# Patient Record
Sex: FEMALE | Race: WHITE
[De-identification: names, ages, dates, MRNs, and addresses within clinical notes are randomized per-mention and may not be internally consistent; named-entity substitution may affect disease eponyms.]

---

## 2020-09-05 ENCOUNTER — HOSPITAL ENCOUNTER (OUTPATIENT)
Dept: HOSPITAL 19 - ZCOL.LAB | Age: 60
End: 2020-09-05
Payer: SELF-PAY

## 2020-09-05 DIAGNOSIS — Z20.828: Primary | ICD-10-CM

## 2021-01-08 ENCOUNTER — HOSPITAL ENCOUNTER (OUTPATIENT)
Dept: HOSPITAL 19 - COL.ER | Age: 61
End: 2021-01-08

## 2021-01-08 DIAGNOSIS — Z20.822: Primary | ICD-10-CM

## 2021-04-04 ENCOUNTER — HOSPITAL ENCOUNTER (EMERGENCY)
Dept: HOSPITAL 19 - COL.ER | Age: 61
Discharge: HOME | End: 2021-04-04
Attending: EMERGENCY MEDICINE
Payer: COMMERCIAL

## 2021-04-04 VITALS — DIASTOLIC BLOOD PRESSURE: 80 MMHG | SYSTOLIC BLOOD PRESSURE: 130 MMHG | TEMPERATURE: 97.8 F

## 2021-04-04 VITALS — HEIGHT: 64.02 IN | BODY MASS INDEX: 25.33 KG/M2 | WEIGHT: 148.37 LBS

## 2021-04-04 VITALS — HEART RATE: 84 BPM
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
## 2021-04-04 DIAGNOSIS — S80.01XA: Primary | ICD-10-CM

## 2021-04-04 DIAGNOSIS — Z87.891: ICD-10-CM
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
## 2021-04-04 DIAGNOSIS — Z88.0: ICD-10-CM

## 2021-04-16 ENCOUNTER — HOSPITAL ENCOUNTER (OUTPATIENT)
Dept: HOSPITAL 19 - WSOH | Age: 61
LOS: 79 days | Discharge: HOME | End: 2021-07-04
Attending: PHYSICIAN ASSISTANT
Payer: COMMERCIAL
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
## 2021-04-16 DIAGNOSIS — Z87.891: ICD-10-CM

## 2021-04-16 DIAGNOSIS — Z90.89: ICD-10-CM

## 2021-04-16 DIAGNOSIS — Z98.1: ICD-10-CM

## 2021-04-16 DIAGNOSIS — M17.11: ICD-10-CM

## 2021-04-16 DIAGNOSIS — K21.9: ICD-10-CM

## 2021-04-16 DIAGNOSIS — S80.01XA: Primary | ICD-10-CM

## 2021-04-16 DIAGNOSIS — M85.80: ICD-10-CM

## 2021-06-23 ENCOUNTER — HOSPITAL ENCOUNTER (OUTPATIENT)
Dept: HOSPITAL 19 - COL.LAB | Age: 61
End: 2021-06-23
Attending: INTERNAL MEDICINE

## 2021-06-23 DIAGNOSIS — Z20.822: Primary | ICD-10-CM

## 2021-10-05 ENCOUNTER — HOSPITAL ENCOUNTER (OUTPATIENT)
Dept: HOSPITAL 19 - EUO | Age: 61
Discharge: HOME | End: 2021-10-05
Attending: FAMILY MEDICINE
Payer: COMMERCIAL

## 2021-10-05 VITALS — SYSTOLIC BLOOD PRESSURE: 142 MMHG | HEART RATE: 102 BPM | DIASTOLIC BLOOD PRESSURE: 89 MMHG

## 2021-10-05 VITALS — HEART RATE: 97 BPM | SYSTOLIC BLOOD PRESSURE: 136 MMHG | DIASTOLIC BLOOD PRESSURE: 90 MMHG

## 2021-10-05 VITALS — DIASTOLIC BLOOD PRESSURE: 104 MMHG | HEART RATE: 101 BPM | SYSTOLIC BLOOD PRESSURE: 145 MMHG

## 2021-10-05 VITALS — HEART RATE: 96 BPM | SYSTOLIC BLOOD PRESSURE: 143 MMHG | DIASTOLIC BLOOD PRESSURE: 91 MMHG

## 2021-10-05 VITALS — WEIGHT: 149.91 LBS | HEIGHT: 64.02 IN | BODY MASS INDEX: 25.59 KG/M2

## 2021-10-05 VITALS — DIASTOLIC BLOOD PRESSURE: 82 MMHG | SYSTOLIC BLOOD PRESSURE: 140 MMHG | HEART RATE: 90 BPM

## 2021-10-05 VITALS — SYSTOLIC BLOOD PRESSURE: 137 MMHG | HEART RATE: 95 BPM | DIASTOLIC BLOOD PRESSURE: 73 MMHG | TEMPERATURE: 100.7 F

## 2021-10-05 VITALS — HEART RATE: 96 BPM | TEMPERATURE: 98.9 F | SYSTOLIC BLOOD PRESSURE: 149 MMHG | DIASTOLIC BLOOD PRESSURE: 85 MMHG

## 2021-10-05 DIAGNOSIS — U07.1: Primary | ICD-10-CM

## 2021-10-05 DIAGNOSIS — J98.4: ICD-10-CM

## 2021-10-05 NOTE — NUR
Pt tolerated infusion without issue. She is escorted out to ED entrance
following 1 hr observation time. INT was DC'd with catheter intact.

## 2022-01-09 ENCOUNTER — HOSPITAL ENCOUNTER (EMERGENCY)
Dept: HOSPITAL 19 - COL.ER | Age: 62
Discharge: HOME | End: 2022-01-09
Attending: EMERGENCY MEDICINE
Payer: COMMERCIAL

## 2022-01-09 VITALS — TEMPERATURE: 98 F

## 2022-01-09 VITALS — WEIGHT: 155.43 LBS | HEIGHT: 67.01 IN | BODY MASS INDEX: 24.39 KG/M2

## 2022-01-09 VITALS — DIASTOLIC BLOOD PRESSURE: 78 MMHG | HEART RATE: 73 BPM | SYSTOLIC BLOOD PRESSURE: 132 MMHG

## 2022-01-09 DIAGNOSIS — K57.92: Primary | ICD-10-CM

## 2022-01-09 DIAGNOSIS — Z88.0: ICD-10-CM

## 2022-01-09 DIAGNOSIS — K21.9: ICD-10-CM

## 2022-01-09 DIAGNOSIS — Z79.899: ICD-10-CM

## 2022-01-09 LAB
ALBUMIN SERPL-MCNC: 4.1 GM/DL (ref 3.4–4.8)
ALP SERPL-CCNC: 78 U/L (ref 40–150)
ALT SERPL-CCNC: 23 U/L (ref 0–55)
ANION GAP SERPL CALC-SCNC: 13 MMOL/L (ref 7–16)
AST SERPL-CCNC: 26 U/L (ref 5–34)
BASOPHILS # BLD: 0 K/MM3 (ref 0–0.2)
BASOPHILS NFR BLD AUTO: 0.6 % (ref 0–2)
BILIRUB SERPL-MCNC: 0.8 MG/DL (ref 0.2–1.2)
BUN SERPL-MCNC: 11 MG/DL (ref 10–20)
CALCIUM SERPL-MCNC: 9.7 MG/DL (ref 8.4–10.2)
CHLORIDE SERPL-SCNC: 104 MMOL/L (ref 98–107)
CO2 SERPL-SCNC: 24 MMOL/L (ref 23–31)
CREAT SERPL-SCNC: 0.71 MG/DL (ref 0.57–1.11)
EOSINOPHIL # BLD: 0.1 K/MM3 (ref 0–0.7)
EOSINOPHIL NFR BLD: 0.8 % (ref 0–4)
ERYTHROCYTE [DISTWIDTH] IN BLOOD BY AUTOMATED COUNT: 13 % (ref 11.5–14.5)
GLUCOSE SERPL-MCNC: 92 MG/DL (ref 70–99)
GRANULOCYTES # BLD AUTO: 51.7 % (ref 42.2–75.2)
HCT VFR BLD AUTO: 41.1 % (ref 37–47)
HGB BLD-MCNC: 13.8 G/DL (ref 12.5–16)
KETONES UR STRIP.AUTO-MCNC: (no result) MG/DL
LIPASE SERPL-CCNC: 23 U/L (ref 8–78)
LYMPHOCYTES # BLD: 2.8 K/MM3 (ref 1.2–3.4)
LYMPHOCYTES NFR BLD: 38.4 % (ref 20–51)
MCH RBC QN AUTO: 33 PG (ref 27–31)
MCHC RBC AUTO-ENTMCNC: 34 G/DL (ref 33–37)
MCV RBC AUTO: 97 FL (ref 80–100)
MONOCYTES # BLD: 0.6 K/MM3 (ref 0.1–0.6)
MONOCYTES NFR BLD AUTO: 8.4 % (ref 1.7–9.3)
NEUTROPHILS # BLD: 3.7 K/MM3 (ref 1.4–6.5)
PH UR STRIP.AUTO: 6 [PH] (ref 5–8)
PLATELET # BLD AUTO: 311 K/MM3 (ref 130–400)
PMV BLD AUTO: 9.9 FL (ref 7.4–10.4)
POTASSIUM SERPL-SCNC: 3.7 MMOL/L (ref 3.5–4.5)
PROT SERPL-MCNC: 7.8 GM/DL (ref 6.2–8.1)
RBC # BLD AUTO: 4.23 M/MM3 (ref 4.1–5.3)
RBC # UR: (no result) /HPF (ref 0–2)
SODIUM SERPL-SCNC: 141 MMOL/L (ref 136–145)
SP GR UR STRIP.AUTO: 1.01 (ref 1–1.03)
SQUAMOUS # URNS: (no result) /HPF (ref 0–10)
URINE LEUKOCYTE ESTERASE: (no result)
URN COLLECT METHOD CLASS: (no result)

## 2022-08-03 ENCOUNTER — HOSPITAL ENCOUNTER (EMERGENCY)
Dept: HOSPITAL 19 - COL.ER | Age: 62
Discharge: HOME | End: 2022-08-03
Attending: EMERGENCY MEDICINE
Payer: COMMERCIAL

## 2022-08-03 VITALS — HEIGHT: 62.99 IN | WEIGHT: 145.28 LBS | BODY MASS INDEX: 25.74 KG/M2

## 2022-08-03 VITALS — DIASTOLIC BLOOD PRESSURE: 93 MMHG | HEART RATE: 97 BPM | SYSTOLIC BLOOD PRESSURE: 146 MMHG

## 2022-08-03 VITALS — TEMPERATURE: 98 F

## 2022-08-03 DIAGNOSIS — Z28.310: ICD-10-CM

## 2022-08-03 DIAGNOSIS — T78.3XXA: Primary | ICD-10-CM
